# Patient Record
Sex: FEMALE | Race: WHITE | NOT HISPANIC OR LATINO
[De-identification: names, ages, dates, MRNs, and addresses within clinical notes are randomized per-mention and may not be internally consistent; named-entity substitution may affect disease eponyms.]

---

## 2017-03-09 ENCOUNTER — RESULT REVIEW (OUTPATIENT)
Age: 22
End: 2017-03-09

## 2017-03-10 ENCOUNTER — OUTPATIENT (OUTPATIENT)
Dept: OUTPATIENT SERVICES | Facility: HOSPITAL | Age: 22
LOS: 1 days | End: 2017-03-10
Payer: COMMERCIAL

## 2017-03-10 ENCOUNTER — APPOINTMENT (OUTPATIENT)
Dept: ULTRASOUND IMAGING | Facility: CLINIC | Age: 22
End: 2017-03-10

## 2017-03-10 DIAGNOSIS — Z00.8 ENCOUNTER FOR OTHER GENERAL EXAMINATION: ICD-10-CM

## 2017-03-10 PROCEDURE — 76642 ULTRASOUND BREAST LIMITED: CPT

## 2017-05-26 ENCOUNTER — RESULT REVIEW (OUTPATIENT)
Age: 22
End: 2017-05-26

## 2017-07-21 ENCOUNTER — RESULT REVIEW (OUTPATIENT)
Age: 22
End: 2017-07-21

## 2018-04-13 ENCOUNTER — RESULT REVIEW (OUTPATIENT)
Age: 23
End: 2018-04-13

## 2018-10-15 ENCOUNTER — RESULT REVIEW (OUTPATIENT)
Age: 23
End: 2018-10-15

## 2019-06-18 ENCOUNTER — APPOINTMENT (OUTPATIENT)
Dept: GASTROENTEROLOGY | Facility: CLINIC | Age: 24
End: 2019-06-18
Payer: COMMERCIAL

## 2019-06-18 VITALS
SYSTOLIC BLOOD PRESSURE: 95 MMHG | BODY MASS INDEX: 23.37 KG/M2 | HEART RATE: 76 BPM | DIASTOLIC BLOOD PRESSURE: 67 MMHG | HEIGHT: 62 IN | WEIGHT: 127 LBS

## 2019-06-18 DIAGNOSIS — D17.71 BENIGN LIPOMATOUS NEOPLASM OF KIDNEY: ICD-10-CM

## 2019-06-18 DIAGNOSIS — Z83.79 FAMILY HISTORY OF OTHER DISEASES OF THE DIGESTIVE SYSTEM: ICD-10-CM

## 2019-06-18 DIAGNOSIS — Z83.49 FAMILY HISTORY OF OTHER ENDOCRINE, NUTRITIONAL AND METABOLIC DISEASES: ICD-10-CM

## 2019-06-18 DIAGNOSIS — R14.0 ABDOMINAL DISTENSION (GASEOUS): ICD-10-CM

## 2019-06-18 DIAGNOSIS — K59.09 OTHER CONSTIPATION: ICD-10-CM

## 2019-06-18 PROCEDURE — 82272 OCCULT BLD FECES 1-3 TESTS: CPT

## 2019-06-18 PROCEDURE — 36415 COLL VENOUS BLD VENIPUNCTURE: CPT

## 2019-06-18 PROCEDURE — 99204 OFFICE O/P NEW MOD 45 MIN: CPT | Mod: 25

## 2019-06-18 RX ORDER — FLUCONAZOLE 150 MG/1
150 TABLET ORAL
Qty: 2 | Refills: 0 | Status: DISCONTINUED | COMMUNITY
Start: 2019-04-09

## 2019-06-18 RX ORDER — CLOTRIMAZOLE AND BETAMETHASONE DIPROPIONATE 10; .5 MG/G; MG/G
1-0.05 CREAM TOPICAL
Qty: 45 | Refills: 0 | Status: DISCONTINUED | COMMUNITY
Start: 2019-04-09

## 2019-06-18 RX ORDER — AZITHROMYCIN 250 MG/1
250 TABLET, FILM COATED ORAL
Qty: 6 | Refills: 0 | Status: DISCONTINUED | COMMUNITY
Start: 2019-04-01

## 2019-06-18 RX ORDER — POLYETHYLENE GLYCOL 3350 AND ELECTROLYTES WITH LEMON FLAVOR 236; 22.74; 6.74; 5.86; 2.97 G/4L; G/4L; G/4L; G/4L; G/4L
236 POWDER, FOR SOLUTION ORAL
Qty: 1 | Refills: 0 | Status: ACTIVE | COMMUNITY
Start: 2019-06-18 | End: 1900-01-01

## 2019-06-18 RX ORDER — OMEPRAZOLE 20 MG/1
20 CAPSULE, DELAYED RELEASE ORAL DAILY
Qty: 30 | Refills: 3 | Status: COMPLETED | COMMUNITY
Start: 2019-06-18 | End: 2019-10-16

## 2019-06-18 NOTE — REVIEW OF SYSTEMS
[Feeling Tired] : feeling tired [Recent Weight Gain (___ Lbs)] : recent [unfilled] ~Ulb weight gain [Nasal Discharge] : nasal discharge [Constipation] : constipation [Pelvic Pain] : pelvic pain [Vaginal Discharge] : vaginal discharge [Abn Vaginal Bleeding] : unexplained vaginal bleeding [Anxiety] : anxiety [Easy Bruising] : a tendency for easy bruising [Negative] : Endocrine [As Noted in HPI] : as noted in HPI [Depression] : no depression

## 2019-06-18 NOTE — PHYSICAL EXAM
[General Appearance - Well Nourished] : well nourished [General Appearance - Alert] : alert [General Appearance - In No Acute Distress] : in no acute distress [PERRL With Normal Accommodation] : pupils were equal in size, round, and reactive to light [Extraocular Movements] : extraocular movements were intact [Sclera] : the sclera and conjunctiva were normal [Oropharynx] : the oropharynx was normal [Hearing Threshold Finger Rub Not Gila] : hearing was normal [Outer Ear] : the ears and nose were normal in appearance [Neck Cervical Mass (___cm)] : no neck mass was observed [Neck Appearance] : the appearance of the neck was normal [Heart Rate And Rhythm] : heart rate was normal and rhythm regular [Auscultation Breath Sounds / Voice Sounds] : lungs were clear to auscultation bilaterally [Bowel Sounds] : normal bowel sounds [Heart Sounds] : normal S1 and S2 [Edema] : there was no peripheral edema [Abdomen Soft] : soft [] : no hepato-splenomegaly [Abdomen Mass (___ Cm)] : no abdominal mass palpated [Abdomen Hernia] : no hernia was discovered [Cervical Lymph Nodes Enlarged Anterior Bilaterally] : anterior cervical [No CVA Tenderness] : no ~M costovertebral angle tenderness [Supraclavicular Lymph Nodes Enlarged Bilaterally] : supraclavicular [No Spinal Tenderness] : no spinal tenderness [Skin Turgor] : normal skin turgor [Skin Color & Pigmentation] : normal skin color and pigmentation [Abnormal Walk] : normal gait [Oriented To Time, Place, And Person] : oriented to person, place, and time [No Focal Deficits] : no focal deficits [External Hemorrhoid] : no external hemorrhoids [FreeTextEntry1] : increased sphincter tone, no external lesions [Occult Blood Positive] : stool was negative for occult blood

## 2019-06-18 NOTE — HISTORY OF PRESENT ILLNESS
[Nausea] : denies nausea [Vomiting] : denies vomiting [Diarrhea] : denies diarrhea [Constipation] : constipation worsened [Yellow Skin Or Eyes (Jaundice)] : denies jaundice [Abdominal Pain] : abdominal pain worsened [Abdominal Swelling] : abdominal swelling worsened [Rectal Pain] : denies rectal pain [_________] : Performed [unfilled] [de-identified] : Melany presents to the office today with her mother for evaluation of constipation and bloating.\par \par She reports that she has had constipation ever since she started college 5 years ago.  Her mother reports that she previously had constipation as a young child, but in elementary and high school, she would have loose stools, 3 x a day, after any meal.  At that time, she was told she had IBS.  When she first started having constipation in college, she was having BMs every 3-5 days.  She was seen by Dr. Jackson (GI) and other doctors, who all advised her to use Miralax, but it did not help.  She underwent a colonoscopy about 5 years ago and was told that she had a twisty colon with a "kink". She was prescribed Linzess which did help but after a few months, she was not given any refills.  Currently, she can go 7-10 days without a BM.  She then starts to feel bloated, has cramping pain which radiates to her back, and feels depressed.  Of note, she previously had recurrent UTIs which she attributed to constipation (stool preventing proper bladder emptying).  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  She wonders if she is at risk for anal HPV given that she has previously had anal intercourse.  She has gained weight in the past year, and has been on various diets which may have contributed to constipation.  She denies family history of colon cancer, but her father has Marley's esophagus. [de-identified] : tortuous colon

## 2019-06-18 NOTE — CONSULT LETTER
[Dear  ___] : Dear  [unfilled], [Consult Letter:] : I had the pleasure of evaluating your patient, [unfilled]. [Consult Closing:] : Thank you very much for allowing me to participate in the care of this patient.  If you have any questions, please do not hesitate to contact me. [Please see my note below.] : Please see my note below. [Sincerely,] : Sincerely, [FreeTextEntry3] : Toby Scruggs MD\par Department of Gastroenterology\par [facility]\par [address] [place] [address], [facility] [address]\[place] [address]\par Tel: [phone]\par Email: [email or web address]

## 2019-06-18 NOTE — ASSESSMENT
[FreeTextEntry1] : 1.  Constipation.  Differential includes idiopathic (IBS-mixed type, dysmotility), pelvic floor dyssynergia, metabolic/endocrine (hypothyroidism, Celiac disease, diabetes, electrolyte abnormalities), or structural lesion (solitary rectal ulcer, tortuous/redundant colon, endometriosis).\par 2.  History of IBS.\par 3.  Angiomyolipoma of kidney.\par 4.  Family history of Marley's esophagus.\par \par Recs:\par - Celiac serologies drawn today.\par - Obtain recent labs for review.\par - HIPAA waiver signed to obtain prior colonoscopy report.\par - PEG prescribed to aid with current symptoms.  After completing prep and evacuating stool, patient was advised to begin high fiber diet with adequate fluids and use a fiber supplement.\par - Linzess (or equivalent) to be prescribed to use for chronic constipation.\par - Trial of simethicone and omeprazole for bloating and upper GI symptoms.\par - If upper GI symptoms do not improve despite improvement in constipation, can consider endoscopy in 2 months.

## 2019-06-19 ENCOUNTER — APPOINTMENT (OUTPATIENT)
Dept: GASTROENTEROLOGY | Facility: CLINIC | Age: 24
End: 2019-06-19

## 2019-06-19 RX ORDER — LUBIPROSTONE 8 UG/1
8 CAPSULE, GELATIN COATED ORAL
Qty: 60 | Refills: 5 | Status: ACTIVE | COMMUNITY
Start: 2019-06-19 | End: 1900-01-01

## 2019-06-19 RX ORDER — LINACLOTIDE 145 UG/1
145 CAPSULE, GELATIN COATED ORAL
Qty: 30 | Refills: 11 | Status: DISCONTINUED | COMMUNITY
Start: 2019-06-18 | End: 2019-06-19

## 2019-06-28 ENCOUNTER — APPOINTMENT (OUTPATIENT)
Dept: GASTROENTEROLOGY | Facility: CLINIC | Age: 24
End: 2019-06-28

## 2019-07-02 ENCOUNTER — APPOINTMENT (OUTPATIENT)
Dept: GASTROENTEROLOGY | Facility: CLINIC | Age: 24
End: 2019-07-02

## 2019-07-02 ENCOUNTER — RESULT REVIEW (OUTPATIENT)
Age: 24
End: 2019-07-02

## 2019-07-09 LAB
ENDOMYSIUM IGA SER QL: NEGATIVE
ENDOMYSIUM IGA TITR SER: NORMAL
GLIADIN IGA SER QL: <5 UNITS
GLIADIN IGG SER QL: <5 UNITS
GLIADIN PEPTIDE IGA SER-ACNC: NEGATIVE
GLIADIN PEPTIDE IGG SER-ACNC: NEGATIVE
TTG IGA SER IA-ACNC: <1.2 U/ML
TTG IGA SER-ACNC: NEGATIVE
TTG IGG SER IA-ACNC: 1.4 U/ML
TTG IGG SER IA-ACNC: NEGATIVE

## 2019-07-09 RX ORDER — LACTULOSE 10 G/15ML
10 SOLUTION ORAL DAILY
Qty: 1 | Refills: 3 | Status: ACTIVE | COMMUNITY
Start: 2019-07-09 | End: 1900-01-01

## 2019-10-29 ENCOUNTER — RESULT REVIEW (OUTPATIENT)
Age: 24
End: 2019-10-29

## 2020-09-01 ENCOUNTER — APPOINTMENT (OUTPATIENT)
Dept: INTERNAL MEDICINE | Facility: CLINIC | Age: 25
End: 2020-09-01

## 2023-01-22 ENCOUNTER — APPOINTMENT (OUTPATIENT)
Dept: ORTHOPEDIC SURGERY | Facility: CLINIC | Age: 28
End: 2023-01-22
Payer: COMMERCIAL

## 2023-01-22 VITALS — HEIGHT: 62 IN | BODY MASS INDEX: 23 KG/M2 | WEIGHT: 125 LBS

## 2023-01-22 DIAGNOSIS — M84.376A STRESS FRACTURE, UNSPECIFIED FOOT, INITIAL ENCOUNTER FOR FRACTURE: ICD-10-CM

## 2023-01-22 PROCEDURE — 73630 X-RAY EXAM OF FOOT: CPT | Mod: LT

## 2023-01-22 PROCEDURE — 73600 X-RAY EXAM OF ANKLE: CPT | Mod: LT

## 2023-01-22 PROCEDURE — L4361: CPT | Mod: LT

## 2023-01-22 PROCEDURE — 99204 OFFICE O/P NEW MOD 45 MIN: CPT

## 2023-01-22 NOTE — IMAGING
[de-identified] : PE L calcaneus: skin intact, healed incision forefoot, mild swelling lateral side, +tenderness to lateral calc, no tenderness medially, no tenderness to medial or lateral malleoli, not tenderness to forefoot, EHL/FHL/ADF/APF intact, sensory intact, 2+DP, calves soft, NT.\par  [Left] : left foot [There are no fractures, subluxations or dislocations. No significant abnormalities are seen] : There are no fractures, subluxations or dislocations. No significant abnormalities are seen

## 2023-01-22 NOTE — ASSESSMENT
[FreeTextEntry1] : A/P L calcaneus stress fracture\par - WBAT, discussed NWB\par - cam walker\par - CT to r/o calcaneus fracture\par - ice/elevation\par - f/u foot/ankle after CT\par

## 2023-01-22 NOTE — HISTORY OF PRESENT ILLNESS
[6] : 6 [0] : 0 [Dull/Aching] : dull/aching [Localized] : localized [Sharp] : sharp [Full time] : Work status: full time [de-identified] : 28 y/o F s/p hit foot on slide with L heel pain 1/19/23. Pain with ambulation. denies any other pain or injury. denies numbness/tingling.\par  [] : Post Surgical Visit: no [FreeTextEntry1] : L foot/ankle [FreeTextEntry3] : 1/19/23 [FreeTextEntry5] : 26 Y/O RHD F eval R foot/ankle S/P direct contact injury when her heel struck the end of a slide on above DOI. 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

## 2023-01-23 ENCOUNTER — TRANSCRIPTION ENCOUNTER (OUTPATIENT)
Age: 28
End: 2023-01-23

## 2023-01-23 ENCOUNTER — APPOINTMENT (OUTPATIENT)
Dept: CT IMAGING | Facility: CLINIC | Age: 28
End: 2023-01-23
Payer: COMMERCIAL

## 2023-01-23 PROCEDURE — 73700 CT LOWER EXTREMITY W/O DYE: CPT | Mod: LT

## 2023-01-23 PROCEDURE — 76376 3D RENDER W/INTRP POSTPROCES: CPT | Mod: LT
